# Patient Record
Sex: FEMALE | Race: BLACK OR AFRICAN AMERICAN
[De-identification: names, ages, dates, MRNs, and addresses within clinical notes are randomized per-mention and may not be internally consistent; named-entity substitution may affect disease eponyms.]

---

## 2018-12-27 ENCOUNTER — HOSPITAL ENCOUNTER (EMERGENCY)
Dept: HOSPITAL 53 - M ED | Age: 23
LOS: 1 days | Discharge: HOME | End: 2018-12-28
Payer: COMMERCIAL

## 2018-12-27 VITALS — HEIGHT: 62 IN | WEIGHT: 160.34 LBS | BODY MASS INDEX: 29.51 KG/M2

## 2018-12-27 DIAGNOSIS — J20.8: Primary | ICD-10-CM

## 2018-12-28 VITALS — SYSTOLIC BLOOD PRESSURE: 107 MMHG | DIASTOLIC BLOOD PRESSURE: 59 MMHG

## 2018-12-28 LAB
ALBUMIN SERPL BCG-MCNC: 4.2 GM/DL (ref 3.2–5.2)
ALT SERPL W P-5'-P-CCNC: 25 U/L (ref 12–78)
APTT BLD: 26.8 SECONDS (ref 25.4–37.6)
B-HCG SERPL QL: NEGATIVE
BASOPHILS # BLD AUTO: 0 10^3/UL (ref 0–0.2)
BASOPHILS NFR BLD AUTO: 0.3 % (ref 0–1)
BILIRUB CONJ SERPL-MCNC: < 0.1 MG/DL (ref 0–0.2)
BILIRUB SERPL-MCNC: 0.2 MG/DL (ref 0.2–1)
BUN SERPL-MCNC: 9 MG/DL (ref 7–18)
CALCIUM SERPL-MCNC: 9.6 MG/DL (ref 8.5–10.1)
CHLORIDE SERPL-SCNC: 103 MEQ/L (ref 98–107)
CK MB CFR.DF SERPL CALC: 0.6
CK MB SERPL-MCNC: < 1 NG/ML (ref ?–3.6)
CK SERPL-CCNC: 167 U/L (ref 26–192)
CO2 SERPL-SCNC: 28 MEQ/L (ref 21–32)
CREAT SERPL-MCNC: 0.88 MG/DL (ref 0.55–1.3)
D DIMER PPP DDU-MCNC: < 270 NG/ML (ref ?–500)
EOSINOPHIL # BLD AUTO: 0 10^3/UL (ref 0–0.5)
EOSINOPHIL NFR BLD AUTO: 0.8 % (ref 0–3)
GFR SERPL CREATININE-BSD FRML MDRD: > 60 ML/MIN/{1.73_M2} (ref 60–?)
GLUCOSE SERPL-MCNC: 93 MG/DL (ref 70–100)
HCT VFR BLD AUTO: 38.6 % (ref 36–47)
HGB BLD-MCNC: 12.4 G/DL (ref 12–15.5)
INR PPP: 0.99
LIPASE SERPL-CCNC: 130 U/L (ref 73–393)
LYMPHOCYTES # BLD AUTO: 1.7 10^3/UL (ref 1.5–6.5)
LYMPHOCYTES NFR BLD AUTO: 46 % (ref 24–44)
MAGNESIUM SERPL-MCNC: 1.9 MG/DL (ref 1.8–2.4)
MCH RBC QN AUTO: 28.6 PG (ref 27–33)
MCHC RBC AUTO-ENTMCNC: 32.1 G/DL (ref 32–36.5)
MCV RBC AUTO: 88.9 FL (ref 80–96)
MONOCYTES # BLD AUTO: 0.3 10^3/UL (ref 0–0.8)
MONOCYTES NFR BLD AUTO: 7.4 % (ref 0–5)
NEUTROPHILS # BLD AUTO: 1.7 10^3/UL (ref 1.8–7.7)
NEUTROPHILS NFR BLD AUTO: 45.2 % (ref 36–66)
PLATELET # BLD AUTO: 224 10^3/UL (ref 150–450)
POTASSIUM SERPL-SCNC: 3.9 MEQ/L (ref 3.5–5.1)
PROT SERPL-MCNC: 8.2 GM/DL (ref 6.4–8.2)
PROTHROMBIN TIME: 13.2 SECONDS (ref 12.1–14.4)
RBC # BLD AUTO: 4.34 10^6/UL (ref 4–5.4)
SODIUM SERPL-SCNC: 137 MEQ/L (ref 136–145)
T4 FREE SERPL-MCNC: 0.89 NG/DL (ref 0.76–1.46)
TROPONIN I SERPL-MCNC: < 0.02 NG/ML (ref ?–0.1)
TSH SERPL DL<=0.005 MIU/L-ACNC: 1.15 UIU/ML (ref 0.36–3.74)
WBC # BLD AUTO: 3.7 10^3/UL (ref 4–10)

## 2018-12-28 NOTE — REP
Chest x-ray:  Two views.

 

History: Chest pain .

 

Comparison study: No comparison .

 

Findings:  The lungs are well inflated and free of infiltrate.  The pleural

angles are sharp.  The heart size is normal.  Pulmonary vasculature is not

increased.  No significant bony abnormality is seen.

 

Impression:

 

Negative chest x-ray.

 

 

Electronically Signed by

Riley Nguyen MD 12/28/2018 07:42 A

## 2018-12-28 NOTE — ECGEPIP
Stationary ECG Study

                           [facility] ED

                                       

                                       Test Date:    2018

Pat Name:     TATE ANDERSON          Department:   

Patient ID:   [id number]                 Room:         -

Gender:       F                        Technician:   gardenia

:          1995               Requested By: SOFYA BOYKIN PA-C

Order Number: [id number]     Reading MD:   Roberto Jacobs

                                 Measurements

Intervals                              Axis          

Rate:         66                       P:            68

DE:           162                      QRS:          55

QRSD:         85                       T:            37

QT:           376                                    

QTc:          395                                    

                           Interpretive Statements

SINUS RHYTHM

BENIGN EARLY REPOLARIZATION

NO PRIORS FOR COMPARISON

 

Electronically Signed On 2018 11:22:51 EST by Roberto Jacobs

## 2019-02-18 ENCOUNTER — HOSPITAL ENCOUNTER (EMERGENCY)
Dept: HOSPITAL 53 - M ED | Age: 24
Discharge: HOME | End: 2019-02-18
Payer: COMMERCIAL

## 2019-02-18 VITALS — WEIGHT: 160.34 LBS | BODY MASS INDEX: 29.51 KG/M2 | HEIGHT: 62 IN

## 2019-02-18 VITALS — DIASTOLIC BLOOD PRESSURE: 78 MMHG | SYSTOLIC BLOOD PRESSURE: 125 MMHG

## 2019-02-18 DIAGNOSIS — O20.0: Primary | ICD-10-CM

## 2019-02-18 DIAGNOSIS — Z3A.01: ICD-10-CM

## 2019-02-18 LAB
ALBUMIN SERPL BCG-MCNC: 3.9 GM/DL (ref 3.2–5.2)
ALT SERPL W P-5'-P-CCNC: 21 U/L (ref 12–78)
AMYLASE SERPL-CCNC: 100 U/L (ref 25–115)
B-HCG SERPL QL: POSITIVE
B-HCG SERPL-ACNC: 6128 MIU/ML
BASOPHILS # BLD AUTO: 0 10^3/UL (ref 0–0.2)
BASOPHILS NFR BLD AUTO: 0.2 % (ref 0–1)
BILIRUB CONJ SERPL-MCNC: < 0.1 MG/DL (ref 0–0.2)
BILIRUB SERPL-MCNC: 0.1 MG/DL (ref 0.2–1)
BUN SERPL-MCNC: 8 MG/DL (ref 7–18)
CALCIUM SERPL-MCNC: 8.3 MG/DL (ref 8.5–10.1)
CHLORIDE SERPL-SCNC: 105 MEQ/L (ref 98–107)
CO2 SERPL-SCNC: 24 MEQ/L (ref 21–32)
CREAT SERPL-MCNC: 0.76 MG/DL (ref 0.55–1.3)
EOSINOPHIL # BLD AUTO: 0 10^3/UL (ref 0–0.5)
EOSINOPHIL NFR BLD AUTO: 0.6 % (ref 0–3)
GFR SERPL CREATININE-BSD FRML MDRD: > 60 ML/MIN/{1.73_M2} (ref 60–?)
GLUCOSE SERPL-MCNC: 80 MG/DL (ref 70–100)
HCT VFR BLD AUTO: 36.1 % (ref 36–47)
HGB BLD-MCNC: 11.7 G/DL (ref 12–15.5)
LIPASE SERPL-CCNC: 80 U/L (ref 73–393)
LYMPHOCYTES # BLD AUTO: 3 10^3/UL (ref 1.5–6.5)
LYMPHOCYTES NFR BLD AUTO: 48.9 % (ref 24–44)
MCH RBC QN AUTO: 29 PG (ref 27–33)
MCHC RBC AUTO-ENTMCNC: 32.4 G/DL (ref 32–36.5)
MCV RBC AUTO: 89.4 FL (ref 80–96)
MONOCYTES # BLD AUTO: 0.5 10^3/UL (ref 0–0.8)
MONOCYTES NFR BLD AUTO: 8 % (ref 0–5)
NEUTROPHILS # BLD AUTO: 2.6 10^3/UL (ref 1.8–7.7)
NEUTROPHILS NFR BLD AUTO: 42.1 % (ref 36–66)
PLATELET # BLD AUTO: 238 10^3/UL (ref 150–450)
POTASSIUM SERPL-SCNC: 3.6 MEQ/L (ref 3.5–5.1)
PROT SERPL-MCNC: 7.5 GM/DL (ref 6.4–8.2)
RBC # BLD AUTO: 4.04 10^6/UL (ref 4–5.4)
SODIUM SERPL-SCNC: 138 MEQ/L (ref 136–145)
WBC # BLD AUTO: 6.2 10^3/UL (ref 4–10)

## 2019-02-18 PROCEDURE — 84703 CHORIONIC GONADOTROPIN ASSAY: CPT

## 2019-02-18 PROCEDURE — 81001 URINALYSIS AUTO W/SCOPE: CPT

## 2019-02-18 PROCEDURE — 96375 TX/PRO/DX INJ NEW DRUG ADDON: CPT

## 2019-02-18 PROCEDURE — 83690 ASSAY OF LIPASE: CPT

## 2019-02-18 PROCEDURE — 76817 TRANSVAGINAL US OBSTETRIC: CPT

## 2019-02-18 PROCEDURE — 85025 COMPLETE CBC W/AUTO DIFF WBC: CPT

## 2019-02-18 PROCEDURE — 80076 HEPATIC FUNCTION PANEL: CPT

## 2019-02-18 PROCEDURE — 99284 EMERGENCY DEPT VISIT MOD MDM: CPT

## 2019-02-18 PROCEDURE — 96374 THER/PROPH/DIAG INJ IV PUSH: CPT

## 2019-02-18 PROCEDURE — 80048 BASIC METABOLIC PNL TOTAL CA: CPT

## 2019-02-18 PROCEDURE — 84702 CHORIONIC GONADOTROPIN TEST: CPT

## 2019-02-18 PROCEDURE — 76801 OB US < 14 WKS SINGLE FETUS: CPT

## 2019-02-18 PROCEDURE — 82150 ASSAY OF AMYLASE: CPT

## 2019-02-18 PROCEDURE — 93976 VASCULAR STUDY: CPT

## 2019-02-18 NOTE — REPVR
EXAM: 

 US Pregnancy First Trimester, Transabdominal 



EXAM DATE/TIME: 

 2/18/2019 9:10 PM 



CLINICAL HISTORY: 

 23 years old, female; Pain; Pregnancy complicated by abdominal or pelvic pain; 

Left lower quadrant; First trimester; Gestational age or lmp: 5 week 2 day; 

Pregnant; Additional info: Abd pain 



TECHNIQUE: 

 Real-time transabdominal obstetrical ultrasound of the maternal pelvis and a 

first trimester pregnancy, less than 14 weeks 0 days, with image documentation. 



COMPARISON: 

 No relevant prior studies available. 



FINDINGS: 



GESTATION: 

 Gestation:  Gestational sac within the uterus containing a yolk sac but no 

fetal pole at this time. 

 Placenta: Unremarkable. No subchorionic bleed. 



BIOMETRY: 

 Mean sac diameter:  Mean sac size is 9 mm suggesting an age of 5 weeks 5 days. 



MATERNAL: 

 Uterus:  The uterus measures 9.9 cm in its cephalocaudad dimension and 5.4 x 

5.8 cm in its AP and lateral dimensions transabdominal. The uterus measures 9.7 

cm in its cephalocaudad dimension and 6.1 cm in its lateral dimension 

transvaginal. The endometrium measures 15 mm. 

 Cervix: Unremarkable. 

 Right adnexa:  Right parovarian cyst/follicle measuring 19 x 11 x 12 mm. The 

right ovary measures 4.5 x 2.1 x 2.8 cm with probable corpus luteum cyst 

measuring 2.7 x 1.8 x 2.5 cm. There is right ovarian blood flow. 

 Left adnexa:  The left ovary measures 1.6 x 3.1 x 1.3 cm and demonstrates 

blood flow. 

 Intraperitoneal: No intraperitoneal free fluid. 



IMPRESSION: 

1. Gestational sac within the uterus with a yolk sac but no fetal pole at this 

time. Mean sac size is 9 mm. Followup in 1-2 weeks may be of benefit for 

further evaluation. 

2. Right parovarian cyst/follicle measuring 19 x 11 x 12 mm. Probable right 

corpus luteum cyst measuring up to 2.7 cm. 

3. Otherwise negative pelvic sonogram. 



Electronically signed by: Phillip Arriola On 02/18/2019  22:06:32 PM

## 2019-04-01 ENCOUNTER — HOSPITAL ENCOUNTER (EMERGENCY)
Dept: HOSPITAL 53 - M ED | Age: 24
Discharge: HOME | End: 2019-04-01
Payer: COMMERCIAL

## 2019-04-01 VITALS — WEIGHT: 170.2 LBS | BODY MASS INDEX: 30.16 KG/M2 | HEIGHT: 63 IN

## 2019-04-01 VITALS — DIASTOLIC BLOOD PRESSURE: 84 MMHG | SYSTOLIC BLOOD PRESSURE: 127 MMHG

## 2019-04-01 DIAGNOSIS — O20.8: Primary | ICD-10-CM

## 2019-04-01 DIAGNOSIS — Z3A.12: ICD-10-CM

## 2019-04-01 LAB
CHLAMYDIA DNA AMPLIFICATION: NEGATIVE
HCT VFR BLD AUTO: 38.7 % (ref 36–47)
HGB BLD-MCNC: 12.7 G/DL (ref 12–15.5)
MCH RBC QN AUTO: 28.9 PG (ref 27–33)
MCHC RBC AUTO-ENTMCNC: 32.8 G/DL (ref 32–36.5)
MCV RBC AUTO: 88.2 FL (ref 80–96)
N GONORRHOEA RRNA SPEC QL NAA+PROBE: NEGATIVE
PLATELET # BLD AUTO: 244 10^3/UL (ref 150–450)
RBC # BLD AUTO: 4.39 10^6/UL (ref 4–5.4)
WBC # BLD AUTO: 6.4 10^3/UL (ref 4–10)

## 2019-04-01 NOTE — REP
Clinical:  Vaginal bleeding for viability.

 

Technique:  Transabdominal first trimester obstetrical ultrasound with color

Doppler evaluation.

 

Findings:

Single live early intrauterine pregnancy is appreciated.  Gestational sac with

fetal pole identified.  Crown-rump length  of 55 mm corresponds to 12 weeks 1 day

gestational age with estimated date of delivery 10/13/2019 .  Fetal heart rate

equals 169 beats per minute.  Placenta is identified posterior fundally and

without previa.

 

A large subchorionic hemorrhage is identified in the lower uterine segment

measuring 6.8 x 2.9 x 6.9 cm.

 

Maternal ovaries are normal in appearance and vascularity without torsion.  Right

ovary measures 0.6 x 2.2 x 1.6 cm; RI 0.63 and includes corpus luteal cyst.  Left

ovary measures 3.4 x 1.7 x 2.0 cm; RI 0.62.

 

Impression:

1.  Single live early intrauterine pregnancy at 12 weeks 1 day gestational age.

Complete anatomical assessment should be performed and 19-20 weeks.

2.  Large subchorionic hemorrhage identified in the lower uterine segment.

 

 

Electronically Signed by

Shiva Andrea MD 04/01/2019 05:09 P

## 2019-04-11 ENCOUNTER — HOSPITAL ENCOUNTER (EMERGENCY)
Dept: HOSPITAL 53 - M ED | Age: 24
LOS: 1 days | Discharge: HOME | End: 2019-04-12
Payer: COMMERCIAL

## 2019-04-11 VITALS — WEIGHT: 170.35 LBS | HEIGHT: 63 IN | BODY MASS INDEX: 30.18 KG/M2

## 2019-04-11 DIAGNOSIS — Z3A.13: ICD-10-CM

## 2019-04-11 DIAGNOSIS — O99.89: Primary | ICD-10-CM

## 2019-04-11 DIAGNOSIS — R10.9: ICD-10-CM

## 2019-04-11 LAB
BASOPHILS # BLD AUTO: 0 10^3/UL (ref 0–0.2)
BASOPHILS NFR BLD AUTO: 0.5 % (ref 0–1)
BUN SERPL-MCNC: 10 MG/DL (ref 7–18)
CALCIUM SERPL-MCNC: 8.6 MG/DL (ref 8.5–10.1)
CHLORIDE SERPL-SCNC: 107 MEQ/L (ref 98–107)
CO2 SERPL-SCNC: 24 MEQ/L (ref 21–32)
CREAT SERPL-MCNC: 0.83 MG/DL (ref 0.55–1.3)
EOSINOPHIL # BLD AUTO: 0.1 10^3/UL (ref 0–0.5)
EOSINOPHIL NFR BLD AUTO: 1.4 % (ref 0–3)
GFR SERPL CREATININE-BSD FRML MDRD: > 60 ML/MIN/{1.73_M2} (ref 60–?)
GLUCOSE SERPL-MCNC: 101 MG/DL (ref 70–100)
HCT VFR BLD AUTO: 34.9 % (ref 36–47)
HGB BLD-MCNC: 11.4 G/DL (ref 12–15.5)
LYMPHOCYTES # BLD AUTO: 1.9 10^3/UL (ref 1.5–6.5)
LYMPHOCYTES NFR BLD AUTO: 44.6 % (ref 24–44)
MCH RBC QN AUTO: 29.3 PG (ref 27–33)
MCHC RBC AUTO-ENTMCNC: 32.7 G/DL (ref 32–36.5)
MCV RBC AUTO: 89.7 FL (ref 80–96)
MONOCYTES # BLD AUTO: 0.5 10^3/UL (ref 0–0.8)
MONOCYTES NFR BLD AUTO: 12.4 % (ref 0–5)
NEUTROPHILS # BLD AUTO: 1.7 10^3/UL (ref 1.8–7.7)
NEUTROPHILS NFR BLD AUTO: 40.9 % (ref 36–66)
PLATELET # BLD AUTO: 191 10^3/UL (ref 150–450)
POTASSIUM SERPL-SCNC: 3.9 MEQ/L (ref 3.5–5.1)
RBC # BLD AUTO: 3.89 10^6/UL (ref 4–5.4)
SODIUM SERPL-SCNC: 139 MEQ/L (ref 136–145)
WBC # BLD AUTO: 4.3 10^3/UL (ref 4–10)

## 2019-04-11 NOTE — REPVR
EXAM: 

 US Pregnancy First Trimester, Transabdominal 



EXAM DATE/TIME: 

 4/11/2019 10:14 PM 



CLINICAL HISTORY: 

 23 years old, female; Pain; Pregnancy complicated by abdominal or pelvic pain; 

Lower; First trimester; Gestational age or lmp: 12w 5d; Pregnant; Additional 

info: Abdominal pain/14 weeks pregnant 



TECHNIQUE: 

 Imaging protocol: Real-time transabdominal obstetrical ultrasound of the 

maternal pelvis and a first trimester pregnancy, less than 14 weeks 0 days, 

with image documentation. 



COMPARISON: 

 No relevant prior studies available. 



FINDINGS: 



GESTATION: 

 Gestation: Single live intrauterine gestation. 

 Heart rate: Fetal heart rate measures 160 beats per minute. 

 Placenta: Placenta is anterior. Large anechoic collection in the posterior sac 

measuring 6.2 x 2.4 x 4.5 cm. 

 Amniotic fluid: Amniotic fluid is normal for gestational age. 



BIOMETRY: 

 Estimated gestational age: Estimated gestational age is 13 weeks 5 day. 

 Crown-Rump length: Crown-rump length measures 7.5 cm. 93 percentile. 

 Estimated due date: Estimated due date is 10/13/2019 by ultrasound. 



MATERNAL: 

 Uterus: Unremarkable. 

 Cervix: Unremarkable. 

 Right adnexa: Unremarkable. 

 Left adnexa: Unremarkable. 

 Intraperitoneal: No intraperitoneal free fluid. 



IMPRESSION: 

1. Single live intrauterine gestation. 

2. There is appropriate interval growth from the prior study. 

3. Estimated gestational age 13 weeks 5 day. 

4. Estimated due date is 10/13/2019 by ultrasound. 

5. Large anechoic collection in the posterior sac. Consistent with resolving 

subchorionic bleed. 



Electronically signed by: Cinda Taylor On 04/11/2019  23:07:04 PM

## 2019-04-12 VITALS — DIASTOLIC BLOOD PRESSURE: 64 MMHG | SYSTOLIC BLOOD PRESSURE: 105 MMHG
